# Patient Record
Sex: FEMALE | Race: WHITE | NOT HISPANIC OR LATINO | URBAN - METROPOLITAN AREA
[De-identification: names, ages, dates, MRNs, and addresses within clinical notes are randomized per-mention and may not be internally consistent; named-entity substitution may affect disease eponyms.]

---

## 2017-03-17 ENCOUNTER — OFFICE VISIT - HEALTHEAST (OUTPATIENT)
Dept: FAMILY MEDICINE | Facility: CLINIC | Age: 22
End: 2017-03-17

## 2017-03-17 DIAGNOSIS — R06.02 SHORTNESS OF BREATH: ICD-10-CM

## 2017-03-17 DIAGNOSIS — R07.9 CHEST PAIN: ICD-10-CM

## 2017-03-17 LAB
ATRIAL RATE - MUSE: 91 BPM
DIASTOLIC BLOOD PRESSURE - MUSE: NORMAL MMHG
INTERPRETATION ECG - MUSE: NORMAL
P AXIS - MUSE: 76 DEGREES
PR INTERVAL - MUSE: 150 MS
QRS DURATION - MUSE: 82 MS
QT - MUSE: 354 MS
QTC - MUSE: 435 MS
R AXIS - MUSE: 76 DEGREES
SYSTOLIC BLOOD PRESSURE - MUSE: NORMAL MMHG
T AXIS - MUSE: 65 DEGREES
VENTRICULAR RATE- MUSE: 91 BPM

## 2017-04-04 ENCOUNTER — OFFICE VISIT - HEALTHEAST (OUTPATIENT)
Dept: ALLERGY | Facility: CLINIC | Age: 22
End: 2017-04-04

## 2017-04-04 DIAGNOSIS — R06.02 SHORTNESS OF BREATH: ICD-10-CM

## 2017-04-04 DIAGNOSIS — R09.81 NASAL CONGESTION: ICD-10-CM

## 2017-04-04 ASSESSMENT — MIFFLIN-ST. JEOR: SCORE: 1458.03

## 2017-09-10 ENCOUNTER — RECORDS - HEALTHEAST (OUTPATIENT)
Dept: ADMINISTRATIVE | Facility: OTHER | Age: 22
End: 2017-09-10

## 2018-11-19 ENCOUNTER — COMMUNICATION - HEALTHEAST (OUTPATIENT)
Dept: FAMILY MEDICINE | Facility: CLINIC | Age: 23
End: 2018-11-19

## 2018-11-26 ENCOUNTER — OFFICE VISIT - HEALTHEAST (OUTPATIENT)
Dept: FAMILY MEDICINE | Facility: CLINIC | Age: 23
End: 2018-11-26

## 2018-11-26 DIAGNOSIS — F41.9 ANXIETY: ICD-10-CM

## 2018-11-26 DIAGNOSIS — R53.82 CHRONIC FATIGUE: ICD-10-CM

## 2018-11-26 DIAGNOSIS — F33.9 RECURRENT MAJOR DEPRESSIVE DISORDER, REMISSION STATUS UNSPECIFIED (H): ICD-10-CM

## 2018-11-26 RX ORDER — ALPRAZOLAM 0.5 MG
TABLET ORAL
Qty: 30 TABLET | Refills: 0 | Status: SHIPPED | OUTPATIENT
Start: 2018-11-26

## 2018-11-26 RX ORDER — CHLORAL HYDRATE 500 MG
2 CAPSULE ORAL DAILY
Status: SHIPPED | COMMUNITY
Start: 2018-11-26

## 2019-03-09 ENCOUNTER — COMMUNICATION - HEALTHEAST (OUTPATIENT)
Dept: FAMILY MEDICINE | Facility: CLINIC | Age: 24
End: 2019-03-09

## 2019-03-12 RX ORDER — VENLAFAXINE HYDROCHLORIDE 37.5 MG/1
CAPSULE, EXTENDED RELEASE ORAL
Qty: 30 CAPSULE | Refills: 0 | Status: SHIPPED | OUTPATIENT
Start: 2019-03-12

## 2021-05-30 VITALS — BODY MASS INDEX: 23.54 KG/M2 | WEIGHT: 150 LBS | HEIGHT: 67 IN

## 2021-05-30 VITALS — BODY MASS INDEX: 24.39 KG/M2 | WEIGHT: 160.4 LBS

## 2021-06-02 VITALS — WEIGHT: 161 LBS | BODY MASS INDEX: 25.22 KG/M2

## 2021-06-09 NOTE — PROGRESS NOTES
Assessment:  Shortness of breath.  Unclear cause.  Consider possible asthma diagnosis however this may be related to patient's known existing anxiety disorder.  Normal nitric oxide  Negative allergy tests  Indeterminate Spirometry      Plan:    Methacholine challenge to rule out asthma.  ____________________________________________________________________________                                                             Michela is here for evaluation of possible asthma.  She reports feeling short of breath since December.  She reports having very conscious of breathing.  She feels chest pressure.  No wheezing heard.  She has a nonproductive cough.  She does occasionally feel dizzy when breathing hard.  She feels like she may pass out.  When checked her oxygen levels in clinic have been normal.  She did have a history of asthma as a toddler but no recent history of asthma or use of inhalers.  She does have a recent diagnosis of OCD last spring.  She does have symptoms of allergy with rhinorrhea, nasal congestion, watery eyes.  She reports no clear trigger for her symptoms.  She did get into a new apartment in September.  She does seem to be a little bit worse when laying down.  She does use Flonase daily over the past several weeks.  No other asthma or allergy medications.    Review of symptoms:  as above otherwise negative.    Past medical history: No other chronic medical conditions noted.    Allergies: No known allergies to medications, latex , foods or hymenoptera venom.  She does report hallucinations with NyQuil    Family History:  No known member of the family with allergy or asthma.    Social history: Currently attends college.  She is in student housing.  She reports that her apartment is fairly new.  No visible water seepage or mold.  There are cats in her parents home which she visits approximately once a week.  No cigarette smoking history.    Medications: Reviewed in chart.    Physical Exam:  General:   Alert and oriented.  Eyes:  Sclera clear.  Ears: TMs translucent grey with bony landmarks visible. Nose: Pale, boggy mucosal membranes.  Throat: Pink, mosit.  No lesions.  Neck: Supple.  No lymphadenopathy.  Lungs: CTA.  CV: Regular rate and rhythm. Extremities: Well perfused.  No clubbing or cyanosis. Skin: No rash.    Last Percutaneous Allergy Test Results  Trees  Mika, White  1:20 H  (W/F in mm): 0 (04/04/17 1522)  Birch Mix 1:20 H (W/F in mm): 0 (04/04/17 1522)  [place], Common 1:20 H (W/F in mm): 0 (04/04/17 1522)  Elm, American 1:20 H (W/F in mm): 0 (04/04/17 1522)  [place], Shagbark 1:20 H (W/F in mm): 0 (04/04/17 1522)  Maple, Hard/Sugar 1:20 H (W/F in mm): 0 (04/04/17 1522)  [place] Mix 1:20 H (W/F in mm): 0 (04/04/17 1522)  [place], Red 1:20 H (W/F in mm): 0 (04/04/17 1522)  [place], American 1:20 H (W/F in mm): 0 (04/04/17 1522)  [place] Tree 1:20 H (W/F in mm): 0 (04/04/17 1522)  Dust Mites  D. Pteronyssinus Mite 30,000 AU/ML H (W/F in mm): 0 (04/04/17 1522)  D. Farinae Mite 30,000 AU/ML H (W/F in mm: 0 (04/04/17 1522)  Grasses  Grass Mix #4 10,000 BAU/ML H: 0 (04/04/17 1522)  Rey Grass 1:20 H (W/F in mm): 0 (04/04/17 1522)  Cockroach  Cockroach Mix 1:10 H (W/F in mm): 0 (04/04/17 1522)  Molds/Fungi  Alternaria Tenuis 1:10 H (W/F in mm): 0 (04/04/17 1522)  Aspergillus Fumigatus 1:10 H (W/F in mm): 0 (04/04/17 1522)  Homodendrum Cladosporioides 1:10 H (W/F in mm): 0 (04/04/17 1522)  Penicillin Notatum 1:10 H (W/F in mm): 0 (04/04/17 1522)  Epicoccum 1:10 H (W/F in mm): 0 (04/04/17 1522)  Weeds  Ragweed, Short 1:20 H (W/F in mm): 0 (04/04/17 1522)  Dock, Sorrel 1:20 H (W/F in mm): 0 (04/04/17 1522)  Lamb's Quarter 1:20 H (W/F in mm): 0 (04/04/17 1522)  Pigweed, Rough Red Root 1:20 H  (W/F in mm): 0 (04/04/17 1522)  Plantain, English 1:20 H  (W/F in mm): 0 (04/04/17 1522)  Sagebrush, Mugwort 1:20 H  (W/F in mm): 0 (04/04/17 1522)  Animal  Cat 10,000 BAU/ML H (W/F in mm): 0 (04/04/17 1522)  Dog 1:10 H  (W/F in mm): 0 (04/04/17 1522)  Controls  Device Type: QUINTIP (04/04/17 1522)  Neg. control: 50% Glycerine/Saline H (W/F in mm): 0/0 (04/04/17 1522)  Pos. control: Histamine 6mg/ML (W/F in mms): 12/32 (04/04/17 1522)     Spirometry: FEV1 to FVC ratio is 70%.  FEV1 is 3.5 L which is 97% of predicted.  FVC is 5 L which is 121% of predicted.  This is suggestive of airway obstruction however we were not able to get reproducible results and this may not be a valid test.    Nitric oxide is 11 ppb

## 2021-06-09 NOTE — PROGRESS NOTES
"SUBJECTIVE:   Chief Complaint   Patient presents with     Breathing Problem     c/o hard time breathing; unable to take deep breath; chest pain/headaches/anxiety over this; hx of asthma as child     Breathing Problem     was in Urgency Room in [facility] in December for CATIA Ryder 21 y.o. female    Current Outpatient Prescriptions   Medication Sig Dispense Refill     UNABLE TO FIND daily. Med Name:  Ajith Vegan (calcium, b12, iron)       fluticasone (FLONASE) 50 mcg/actuation nasal spray 2 sprays in each nostril daily 16 g 0     No current facility-administered medications for this visit.      Allergies: Nyquil   Patient's last menstrual period was 03/10/2017 (exact date).    HPI:   Pleasant 21-year-old here for difficulty breathing, ongoing since at least December.  She was seen in urgency room in December for this.  States it feels like she cannot get a deep breath.  Also has had some chest pain in the center of the chest, worse with lying down.  Reports all of her muscles in her chest shoulders and neck feel \"tight\".  Had massage therapy and was told she had a lot of muscle tension.  The discomfort is always there.  Notices her heart pounding if she is anxious about the breathing, but otherwise has not felt irregular beats or palpitations.  Denies any known history of allergies, but does report some nasal congestion, rhinorrhea, postnasal drainage, and watery eyes.  History of asthma as a child when young, reports she was hospitalized a couple of times for this.  Is not aware of particular triggers or details of her asthma history.  Has not required any asthma treatment for many years now.  She cannot really comment on exercise tolerance, as she has been avoiding much physical activity since her symptoms started.  Sometimes feels dizzy or lightheaded.    During her workup in December at urgency room, she had a negative d-dimer, normal chest x-ray, normal CBC, BMP, urine pregnancy negative    Denies a " family history of congenital heart disease, sudden cardiac death or unexplained death.       Reports she has a history of anxiety, and feels that her anxiety gets triggered by these symptoms she has been experiencing.  She does not feel that her symptoms of chest pain and difficulty breathing are caused by anxiety.    ROS: as per HPI    OBJECTIVE:   Visit Vitals     /62 (Patient Site: Right Arm, Patient Position: Sitting, Cuff Size: Adult Regular)     Pulse 90     Temp 97.8  F (36.6  C) (Oral)     Resp 16     Wt 160 lb 6.4 oz (72.8 kg)     LMP 03/10/2017 (Exact Date)     SpO2 100%     BMI 24.39 kg/m2       General: Pleasant, well-appearing, in no acute distress.  HEENT: Pupils equal, round, reactive to light.  Oropharynx clear with moist mucous membranes.  TMs clear bilaterally.  Neck supple without lymphadenopathy.  Cardiovascular: Heart regular rate and rhythm, normal S1-S2, no murmurs, rubs, or gallops  Respiratory: Lungs are clear to auscultation bilaterally without wheezes or crackles.  Good air movement throughout.  No increased work of breathing.  Abdomen: Soft, nontender, nondistended.  No guarding or rebound.  Extremities: Warm and well perfused without edema.  Pedal pulses palpable and symmetric   Skin: No jaundice or pallor.   musculoskeletal: chest pain is reproducible by pushing on her sternum       ASSESSMENT/PLAN  1. Shortness of breath  EKG ordered and reviewed, unremarkable, cardiology over read pending.  Chest x-ray and d-dimer are negative for these symptoms in December.  Unlikely cardiac cause of her symptoms, but we will get an echocardiogram.  She has a history of asthma as a child, would recommend further evaluation through asthma and allergy clinic.  She is currently having some chronic sinus or allergy type symptoms, and we discussed that that could contribute to breathing symptoms.  Recommend a trial of Flonase to see if this helps while waiting to see allergy and asthma clinic.  If  workup unremarkable, symptoms could be related to anxiety.  - Electrocardiogram Perform and Read  - Echo Complete; Future  - Ambulatory referral to Allergy    2. Chest pain, shortness of breath  EKG in clinic unremarkable.  Recommend echocardiogram in light of ongoing symptoms since December.  Chest pain is reproducible on exam, consistent with musculoskeletal cause, and patient advised of this.  - Echo Complete; Future

## 2021-06-16 PROBLEM — F33.9 RECURRENT MAJOR DEPRESSIVE DISORDER, REMISSION STATUS UNSPECIFIED (H): Status: ACTIVE | Noted: 2018-11-26

## 2021-06-16 PROBLEM — F41.9 ANXIETY: Status: ACTIVE | Noted: 2018-11-26

## 2021-06-21 NOTE — PROGRESS NOTES
Assessment:   1. Anxiety     2. Recurrent major depressive disorder, remission status unspecified (H)     3. Chronic fatigue         Plan:   Ensure you are getting protein and Iron in diet plus supplements.    Patient wishing to not do labs unless symptoms do not improve. Then could get CMP, CBC, Iron, TSH, B!2, Vit. D, RF, CRISTAL.    To start therapy.    Effexor 37.5 mg daily.    Follow-up in 2-3 months for physical, fasting, sooner if needed.    Can try Melatonin 1-3 mg 30 min before bedtime if needed.    Could get spirometry if needed.    Xanax 0.5 to take 1/2 to 1 tab at bedtime if needed. If repeat scripts needed, we will do controlled substance agreement and urine tox. Not to take with Melatonin or alcohol.    Twenty-five minutes spent with this patient, at least 50% in coordination of care or counseling reguarding the topics discussed in note.    Subjective:  Chief Complaint   Patient presents with     Joint Pain     achy joints, sob, hair loss, brittle nails, fatigue- worsening x few months     Insomnia     trouble sleeping at night x 1 month      Michela Ryder, a 23 y.o. year old, comes in to clinic with above complaints.  She is graduated from college but having a very hard time getting a job.  She is finding this very stressful.  She is living at home with her parents which she finds very helpful.  Her depression but has elevated and her anxiety is elevated.  She does contract against suicide and homicide.  She is just generally not feeling well.  She has had asthma as a child but she said she had negative testing 2 years ago.  She was on birth control in the past but it caused mood problems so she is not on that.  She and her boyfriend are using natural family planning and condoms.  They are also using withdrawal method.  She is fatigued.  She has had a history of irregular menses for years but now over the last year has been regular.  She wonders if having regular periods now could cause some iron  deficiency.  He has started an iron supplement for a week.  She has severe problems with blood draws and that she has vagovagal syncope.  She would like to not have a blood draw if at all possible.  For her depression anxiety she tried citalopram and Zoloft in the past but it blunted her mood so much she would not wish to be on that medication.  She is willing to start a different medication.  She is finding that her muscles are tight in her chest.      Current Outpatient Medications   Medication Sig     cholecalciferol, vitamin D3, (VITAMIN D3 ORAL) Take by mouth.     cyanocobalamin, vitamin B-12, (VITAMIN B12 ORAL) Take by mouth.     ferrous sulfate (FERROUSUL ORAL) Take by mouth.     MAGNESIUM ORAL Take by mouth.     omega-3/dha/epa/fish oil (FISH OIL-OMEGA-3 FATTY ACIDS) 300-1,000 mg capsule Take 2 g by mouth daily .           ZINC ORAL Take by mouth.     ALPRAZolam (XANAX) 0.5 MG tablet 1/2 to 1 tab at bedtime if needed..     venlafaxine (EFFEXOR XR) 37.5 MG 24 hr capsule Take 1 capsule (37.5 mg total) by mouth daily.       Patient Active Problem List   Diagnosis     Amblyopia     Ringing In The Ears (Tinnitus)     Anxiety     Recurrent major depressive disorder, remission status unspecified (H)          Objective:  /79 (Patient Site: Left Arm, Patient Position: Sitting, Cuff Size: Adult Regular)   Pulse 97   Temp 98.6  F (37  C) (Oral)   Wt 161 lb (73 kg)   BMI 25.22 kg/m    General: No apparent distress  Psych: Holds a good conversation, no suicidal or homicidal ideation and contracts against suicide and homicie

## 2021-06-24 NOTE — TELEPHONE ENCOUNTER
RN cannot approve Refill Request    RN can NOT refill this medication overdue for office visits and/or labs.    Srinivas Hidalgo, [facility] Triage/Med Refill 3/12/2019    Requested Prescriptions   Pending Prescriptions Disp Refills     venlafaxine (EFFEXOR-XR) 37.5 MG 24 hr capsule [Pharmacy Med Name: VENLAFAXINE ER 37.5MG CAPSULES] 30 capsule 0     Sig: TAKE 1 CAPSULE(37.5 MG) BY MOUTH DAILY    Venlafaxine/Desvenlafaxine Refill Protocol Failed - 3/9/2019  9:52 AM       Failed - LFT or AST or ALT in last year    No results found for: ALBUMIN, BILITOT, BILIDIR, ALKPHOS, AST, ALT, PROT            Failed - Fasting lipid cascade in last year    Cholesterol   Date Value Ref Range Status   11/30/2016 136 <=199 mg/dL Final     Triglycerides   Date Value Ref Range Status   11/30/2016 146 <=149 mg/dL Final     HDL Cholesterol   Date Value Ref Range Status   11/30/2016 40 (L) >=50 mg/dL Final     LDL Calculated   Date Value Ref Range Status   11/30/2016 67 <=129 mg/dL Final     Patient Fasting > 8hrs?   Date Value Ref Range Status   11/30/2016 Yes  Final   11/30/2016 Yes  Final            Passed - PCP or prescribing provider visit in last year    Last office visit with prescriber/PCP: 11/26/2018 Luz Maria Worthington MD OR same dept: 11/26/2018 Luz Maria Worthington MD OR same specialty: 11/26/2018 Luz Maria Worthington MD  Last physical: 11/30/2016 Last MTM visit: Visit date not found   Next visit within 3 mo: Visit date not found  Next physical within 3 mo: Visit date not found  Prescriber OR PCP: Luz Maria Worthington MD  Last diagnosis associated with med order: There are no diagnoses linked to this encounter.  If protocol passes may refill for 12 months if within 3 months of last provider visit (or a total of 15 months).            Passed - Blood Pressure in last year    BP Readings from Last 1 Encounters:   11/26/18 121/79

## 2021-08-21 ENCOUNTER — HEALTH MAINTENANCE LETTER (OUTPATIENT)
Age: 26
End: 2021-08-21

## 2021-10-16 ENCOUNTER — HEALTH MAINTENANCE LETTER (OUTPATIENT)
Age: 26
End: 2021-10-16

## 2022-09-25 ENCOUNTER — HEALTH MAINTENANCE LETTER (OUTPATIENT)
Age: 27
End: 2022-09-25

## 2023-10-14 ENCOUNTER — HEALTH MAINTENANCE LETTER (OUTPATIENT)
Age: 28
End: 2023-10-14